# Patient Record
Sex: FEMALE | Race: WHITE | URBAN - NONMETROPOLITAN AREA
[De-identification: names, ages, dates, MRNs, and addresses within clinical notes are randomized per-mention and may not be internally consistent; named-entity substitution may affect disease eponyms.]

---

## 2021-10-14 ENCOUNTER — WEB ENCOUNTER (OUTPATIENT)
Dept: URBAN - NONMETROPOLITAN AREA CLINIC 13 | Facility: CLINIC | Age: 86
End: 2021-10-14

## 2021-10-14 ENCOUNTER — LAB OUTSIDE AN ENCOUNTER (OUTPATIENT)
Dept: URBAN - NONMETROPOLITAN AREA CLINIC 13 | Facility: CLINIC | Age: 86
End: 2021-10-14

## 2021-10-14 ENCOUNTER — OFFICE VISIT (OUTPATIENT)
Dept: URBAN - NONMETROPOLITAN AREA CLINIC 13 | Facility: CLINIC | Age: 86
End: 2021-10-14
Payer: MEDICARE

## 2021-10-14 VITALS
HEIGHT: 61 IN | DIASTOLIC BLOOD PRESSURE: 76 MMHG | WEIGHT: 110.4 LBS | BODY MASS INDEX: 20.84 KG/M2 | HEART RATE: 54 BPM | SYSTOLIC BLOOD PRESSURE: 210 MMHG

## 2021-10-14 DIAGNOSIS — R10.10 UPPER ABDOMINAL PAIN: ICD-10-CM

## 2021-10-14 DIAGNOSIS — K21.9 GERD WITHOUT ESOPHAGITIS: ICD-10-CM

## 2021-10-14 DIAGNOSIS — R68.81 EARLY SATIETY: ICD-10-CM

## 2021-10-14 PROCEDURE — 99214 OFFICE O/P EST MOD 30 MIN: CPT | Performed by: NURSE PRACTITIONER

## 2021-10-14 RX ORDER — GLUCOSAMINE/CHONDR SU A SOD 750-600 MG
1 CAPSULE TABLET ORAL ONCE A DAY
Status: ACTIVE | COMMUNITY

## 2021-10-14 RX ORDER — CARBOXYMETHYLCELLULOSE SODIUM 10 MG/ML
AS DIRECTED GEL OPHTHALMIC
Status: ACTIVE | COMMUNITY

## 2021-10-14 RX ORDER — FAMOTIDINE 40 MG/1
AS DIRECTED TABLET, FILM COATED ORAL ONCE A DAY
Status: ACTIVE | COMMUNITY

## 2021-10-14 RX ORDER — PANTOPRAZOLE SODIUM 40 MG/1
1 TABLET TABLET, DELAYED RELEASE ORAL BID
Qty: 180 TABLET | Refills: 0 | OUTPATIENT
Start: 2021-10-14

## 2021-10-14 RX ORDER — LOSARTAN POTASSIUM 50 MG/1
3 TABLETS TABLET ORAL ONCE A DAY
Status: ACTIVE | COMMUNITY

## 2021-10-14 RX ORDER — PANTOPRAZOLE SODIUM 40 MG/1
1 TABLET TABLET, DELAYED RELEASE ORAL ONCE A DAY
Status: ACTIVE | COMMUNITY

## 2021-10-14 RX ORDER — ATORVASTATIN CALCIUM 40 MG/1
1 TABLET TABLET, FILM COATED ORAL ONCE A DAY
Status: ACTIVE | COMMUNITY

## 2021-10-14 RX ORDER — FUROSEMIDE 20 MG/1
1 TABLET TABLET ORAL ONCE A DAY
Status: ACTIVE | COMMUNITY

## 2021-10-14 RX ORDER — ASPIRIN 81 MG/1
1 TABLET TABLET, COATED ORAL ONCE A DAY
Status: ACTIVE | COMMUNITY

## 2021-10-14 RX ORDER — HYDRALAZINE HYDROCHLORIDE 100 MG/1
1 TABLET TABLET, FILM COATED ORAL TWICE A DAY
Status: ACTIVE | COMMUNITY

## 2021-10-14 RX ORDER — AMLODIPINE BESYLATE 5 MG/1
1 TABLET TABLET ORAL TWICE A DAY
Status: ACTIVE | COMMUNITY

## 2021-10-14 RX ORDER — FENOFIBRATE 160 MG/1
1 TABLET TABLET ORAL ONCE A DAY
Status: ACTIVE | COMMUNITY

## 2021-10-14 RX ORDER — LEVOTHYROXINE SODIUM 0.05 MG/1
1 TABLET IN THE MORNING ON AN EMPTY STOMACH TABLET ORAL ONCE A DAY
Status: ACTIVE | COMMUNITY

## 2021-10-14 RX ORDER — METOPROLOL TARTRATE 25 MG/1
1/2 TABLET, FILM COATED ORAL ONCE A DAY
Status: ACTIVE | COMMUNITY

## 2021-10-14 RX ORDER — ISOSORBIDE MONONITRATE 60 MG/1
1 TABLET IN THE MORNING TABLET, EXTENDED RELEASE ORAL ONCE A DAY
Status: ACTIVE | COMMUNITY

## 2021-10-14 NOTE — HPI-TODAY'S VISIT:
10/14/21 Mrs Trinh Grady is a 89 YO F with hx of CKD stage III, CHF, HTN who is presents with complaints of burning abdominal pain, upper abdomen, early satiety, and weight loss. She was referred back by her nephrologist, Dr. Britton, for concerns of possible mesenteric ischemia. Last office visit was 2019 with Dr. Rush. Symptoms ongoing intermittently for 3-4 months but more persistent about 2 months ago. She down about 6#. She was eating tomatoes from her garden daily and noticed symptoms improved when she cut back on this. She takes PPI and Pepcid daily. Pepcid seems to help the burning. Maalox also helps. No change in her bowel habits. Denies rectal bleeding. She was told she had rectal prolapse in the past but does not feel rectum protruding and does not have to reduce prolapse. She last had EGD and colonoscopy several years ago elsewhere. TG

## 2021-11-18 ENCOUNTER — OFFICE VISIT (OUTPATIENT)
Dept: URBAN - NONMETROPOLITAN AREA CLINIC 13 | Facility: CLINIC | Age: 86
End: 2021-11-18
Payer: MEDICARE

## 2021-11-18 VITALS
HEIGHT: 61 IN | WEIGHT: 109 LBS | DIASTOLIC BLOOD PRESSURE: 65 MMHG | BODY MASS INDEX: 20.58 KG/M2 | HEART RATE: 60 BPM | SYSTOLIC BLOOD PRESSURE: 181 MMHG

## 2021-11-18 DIAGNOSIS — R68.81 EARLY SATIETY: ICD-10-CM

## 2021-11-18 DIAGNOSIS — R19.8 BORBORYGMI: ICD-10-CM

## 2021-11-18 DIAGNOSIS — R10.13 MIDEPIGASTRIC PAIN: ICD-10-CM

## 2021-11-18 PROBLEM — 266435005: Status: ACTIVE | Noted: 2021-10-14

## 2021-11-18 PROCEDURE — 99213 OFFICE O/P EST LOW 20 MIN: CPT | Performed by: NURSE PRACTITIONER

## 2021-11-18 RX ORDER — FAMOTIDINE 40 MG/1
AS DIRECTED TABLET, FILM COATED ORAL ONCE A DAY
Status: ACTIVE | COMMUNITY

## 2021-11-18 RX ORDER — FUROSEMIDE 20 MG/1
1 TABLET TABLET ORAL ONCE A DAY
Status: ACTIVE | COMMUNITY

## 2021-11-18 RX ORDER — PANTOPRAZOLE SODIUM 40 MG/1
1 TABLET TABLET, DELAYED RELEASE ORAL BID
Qty: 180 TABLET | Refills: 0 | Status: ACTIVE | COMMUNITY
Start: 2021-10-14

## 2021-11-18 RX ORDER — PANTOPRAZOLE SODIUM 40 MG/1
1 TABLET TABLET, DELAYED RELEASE ORAL ONCE A DAY
Status: ACTIVE | COMMUNITY

## 2021-11-18 RX ORDER — ASPIRIN 81 MG/1
1 TABLET TABLET, COATED ORAL ONCE A DAY
Status: ACTIVE | COMMUNITY

## 2021-11-18 RX ORDER — ISOSORBIDE MONONITRATE 60 MG/1
1 TABLET IN THE MORNING TABLET, EXTENDED RELEASE ORAL ONCE A DAY
Status: ACTIVE | COMMUNITY

## 2021-11-18 RX ORDER — GLUCOSAMINE/CHONDR SU A SOD 750-600 MG
1 CAPSULE TABLET ORAL ONCE A DAY
Status: ACTIVE | COMMUNITY

## 2021-11-18 RX ORDER — PANTOPRAZOLE SODIUM 40 MG/1
1 TABLET TABLET, DELAYED RELEASE ORAL BID
Qty: 180 | Refills: 1

## 2021-11-18 RX ORDER — ATORVASTATIN CALCIUM 40 MG/1
1 TABLET TABLET, FILM COATED ORAL ONCE A DAY
Status: ACTIVE | COMMUNITY

## 2021-11-18 RX ORDER — CARBOXYMETHYLCELLULOSE SODIUM 10 MG/ML
AS DIRECTED GEL OPHTHALMIC
Status: ACTIVE | COMMUNITY

## 2021-11-18 RX ORDER — LEVOTHYROXINE SODIUM 0.05 MG/1
1 TABLET IN THE MORNING ON AN EMPTY STOMACH TABLET ORAL ONCE A DAY
Status: ACTIVE | COMMUNITY

## 2021-11-18 RX ORDER — HYDRALAZINE HYDROCHLORIDE 100 MG/1
1 TABLET TABLET, FILM COATED ORAL TWICE A DAY
Status: ACTIVE | COMMUNITY

## 2021-11-18 RX ORDER — FENOFIBRATE 160 MG/1
1 TABLET TABLET ORAL ONCE A DAY
Status: ACTIVE | COMMUNITY

## 2021-11-18 RX ORDER — METOPROLOL TARTRATE 25 MG/1
1/2 TABLET, FILM COATED ORAL ONCE A DAY
Status: ACTIVE | COMMUNITY

## 2021-11-18 RX ORDER — METRONIDAZOLE 250 MG/1
1 TABLET TABLET ORAL THREE TIMES A DAY
Qty: 21 TABLET | Refills: 0 | OUTPATIENT
Start: 2021-11-18 | End: 2021-11-25

## 2021-11-18 RX ORDER — AMLODIPINE BESYLATE 5 MG/1
1 TABLET TABLET ORAL TWICE A DAY
Status: ACTIVE | COMMUNITY

## 2021-11-18 RX ORDER — LOSARTAN POTASSIUM 50 MG/1
3 TABLETS TABLET ORAL ONCE A DAY
Status: ACTIVE | COMMUNITY

## 2021-11-18 NOTE — HPI-TODAY'S VISIT:
10/14/21 Mrs Trinh Grady is a 89 YO F with hx of CKD stage III, CHF, HTN who is presents with complaints of burning abdominal pain, upper abdomen, early satiety, and weight loss. She was referred back by her nephrologist, Dr. Britton, for concerns of possible mesenteric ischemia. Last office visit was 2019 with Dr. Rush. Symptoms ongoing intermittently for 3-4 months but more persistent about 2 months ago. She down about 6#. She was eating tomatoes from her garden daily and noticed symptoms improved when she cut back on this. She takes PPI and Pepcid daily. Pepcid seems to help the burning. Maalox also helps. No change in her bowel habits. Denies rectal bleeding. She was told she had rectal prolapse in the past but does not feel rectum protruding and does not have to reduce prolapse. She last had EGD and colonoscopy several years ago elsewhere. TG  11/18/2021 Patient presents for follow up for epigastric abdominal burning, early satiety, and weight loss. She completed UGI  11/12/2021 that was unremarkable. She reports her pain is better since starting PPI once daily. However, today she reports lower abdominal burning that moves up to her upper abdomen. She reports normal stools most days but did have diarrhea on Saturday that has since resolved. No bleeding, mucous, fever with this. She continues to have poor appetite however her weight has stabilized. If she has an upset stomach, crackers seem to settle things down. Otherwise she reports borborgymi and gas.  She takes probiotic. TG

## 2022-01-19 ENCOUNTER — OFFICE VISIT (OUTPATIENT)
Dept: URBAN - NONMETROPOLITAN AREA CLINIC 13 | Facility: CLINIC | Age: 87
End: 2022-01-19
Payer: MEDICARE

## 2022-01-19 VITALS
HEIGHT: 61 IN | DIASTOLIC BLOOD PRESSURE: 53 MMHG | HEART RATE: 68 BPM | BODY MASS INDEX: 20.58 KG/M2 | SYSTOLIC BLOOD PRESSURE: 190 MMHG | WEIGHT: 109 LBS

## 2022-01-19 DIAGNOSIS — R10.10 UPPER ABDOMINAL PAIN: ICD-10-CM

## 2022-01-19 DIAGNOSIS — R19.8 BORBORYGMI: ICD-10-CM

## 2022-01-19 DIAGNOSIS — K58.2 MIXED IRRITABLE BOWEL SYNDROME: ICD-10-CM

## 2022-01-19 DIAGNOSIS — R68.81 EARLY SATIETY: ICD-10-CM

## 2022-01-19 PROCEDURE — 99214 OFFICE O/P EST MOD 30 MIN: CPT | Performed by: NURSE PRACTITIONER

## 2022-01-19 RX ORDER — HYDRALAZINE HYDROCHLORIDE 100 MG/1
1 TABLET TABLET, FILM COATED ORAL TWICE A DAY
Status: ACTIVE | COMMUNITY

## 2022-01-19 RX ORDER — LOSARTAN POTASSIUM 50 MG/1
3 TABLETS TABLET ORAL ONCE A DAY
Status: ACTIVE | COMMUNITY

## 2022-01-19 RX ORDER — DICYCLOMINE HYDROCHLORIDE 10 MG/1
1 CAPSULE BEFORE MEALS PRN ABDOMINAL PAIN CAPSULE ORAL THREE TIMES A DAY
Qty: 90 CAPSULE | Refills: 3 | OUTPATIENT
Start: 2022-01-19 | End: 2022-05-19

## 2022-01-19 RX ORDER — FAMOTIDINE 40 MG/1
AS DIRECTED TABLET, FILM COATED ORAL ONCE A DAY
Status: ACTIVE | COMMUNITY

## 2022-01-19 RX ORDER — FUROSEMIDE 20 MG/1
1 TABLET TABLET ORAL ONCE A DAY
Status: ACTIVE | COMMUNITY

## 2022-01-19 RX ORDER — METOPROLOL TARTRATE 25 MG/1
1/2 TABLET, FILM COATED ORAL ONCE A DAY
Status: ACTIVE | COMMUNITY

## 2022-01-19 RX ORDER — PANTOPRAZOLE SODIUM 40 MG/1
1 TABLET TABLET, DELAYED RELEASE ORAL ONCE A DAY
Qty: 90 TABLET | OUTPATIENT

## 2022-01-19 RX ORDER — AMLODIPINE BESYLATE 5 MG/1
1 TABLET TABLET ORAL TWICE A DAY
Status: ACTIVE | COMMUNITY

## 2022-01-19 RX ORDER — ATORVASTATIN CALCIUM 40 MG/1
1 TABLET TABLET, FILM COATED ORAL ONCE A DAY
Status: ACTIVE | COMMUNITY

## 2022-01-19 RX ORDER — LEVOTHYROXINE SODIUM 0.05 MG/1
1 TABLET IN THE MORNING ON AN EMPTY STOMACH TABLET ORAL ONCE A DAY
Status: ACTIVE | COMMUNITY

## 2022-01-19 RX ORDER — ASPIRIN 81 MG/1
1 TABLET TABLET, COATED ORAL ONCE A DAY
Status: ACTIVE | COMMUNITY

## 2022-01-19 RX ORDER — FENOFIBRATE 160 MG/1
1 TABLET TABLET ORAL ONCE A DAY
Status: ACTIVE | COMMUNITY

## 2022-01-19 RX ORDER — PANTOPRAZOLE SODIUM 40 MG/1
1 TABLET TABLET, DELAYED RELEASE ORAL BID
Qty: 180 | Refills: 1 | Status: ACTIVE | COMMUNITY

## 2022-01-19 RX ORDER — GLUCOSAMINE/CHONDR SU A SOD 750-600 MG
1 CAPSULE TABLET ORAL ONCE A DAY
Status: ACTIVE | COMMUNITY

## 2022-01-19 RX ORDER — CARBOXYMETHYLCELLULOSE SODIUM 10 MG/ML
AS DIRECTED GEL OPHTHALMIC
Status: ACTIVE | COMMUNITY

## 2022-01-19 RX ORDER — PANTOPRAZOLE SODIUM 40 MG/1
1 TABLET TABLET, DELAYED RELEASE ORAL ONCE A DAY
Status: ACTIVE | COMMUNITY

## 2022-01-19 RX ORDER — PSYLLIUM HUSK 0.4 G
2 CAPSULES WITH 8 OUNCES OF LIQUID CAPSULE ORAL DAILY
OUTPATIENT
Start: 2022-01-19 | End: 2022-02-18

## 2022-01-19 RX ORDER — METRONIDAZOLE 250 MG/1
1 TABLET TABLET ORAL THREE TIMES A DAY
Qty: 21 TABLET | Refills: 0 | OUTPATIENT
Start: 2022-01-19 | End: 2022-01-26

## 2022-01-19 RX ORDER — ISOSORBIDE MONONITRATE 60 MG/1
1 TABLET IN THE MORNING TABLET, EXTENDED RELEASE ORAL ONCE A DAY
Status: ACTIVE | COMMUNITY

## 2022-01-19 NOTE — HPI-TODAY'S VISIT:
10/14/21 Mrs Trinh Grady is a 89 YO F with hx of CKD stage III, CHF, HTN who is presents with complaints of burning abdominal pain, upper abdomen, early satiety, and weight loss. She was referred back by her nephrologist, Dr. Britton, for concerns of possible mesenteric ischemia. Last office visit was 2019 with Dr. Rush. Symptoms ongoing intermittently for 3-4 months but more persistent about 2 months ago. She down about 6#. She was eating tomatoes from her garden daily and noticed symptoms improved when she cut back on this. She takes PPI and Pepcid daily. Pepcid seems to help the burning. Maalox also helps. No change in her bowel habits. Denies rectal bleeding. She was told she had rectal prolapse in the past but does not feel rectum protruding and does not have to reduce prolapse. She last had EGD and colonoscopy several years ago elsewhere. TG  11/18/2021 Patient presents for follow up for epigastric abdominal burning, early satiety, and weight loss. She completed UGI  11/12/2021 that was unremarkable. She reports her pain is better since starting PPI once daily. However, today she reports lower abdominal burning that moves up to her upper abdomen. She reports normal stools most days but did have diarrhea on Saturday that has since resolved. No bleeding, mucous, fever with this. She continues to have poor appetite however her weight has stabilized. If she has an upset stomach, crackers seem to settle things down. Otherwise she reports borborgymi and gas.  She takes probiotic. TG  1/19/2021 Ms Grady presents for follow up for above symptoms. Her son, Jamin, is with her today. She reports epigastric pain has resolved with PPI BID. Her appetite is stable. No nausea/vomiting. Discussed attempting to wean back down to once daily and she agrees. Her main complaint today is loose stools and urgency for BM resulting in fecal incontinence. She cannot recall if her symptoms improved with flagyl after her last appointment but she reports she did do better with her symptoms until about 3 weeks ago. She had stopped the metamucil so she started this again and she has noticed improvement with more formed stools and less urgency. She does continue with several stools most mornings. She is taking the probiotic. TG

## 2022-04-21 ENCOUNTER — DASHBOARD ENCOUNTERS (OUTPATIENT)
Age: 87
End: 2022-04-21

## 2022-04-21 ENCOUNTER — CLAIMS CREATED FROM THE CLAIM WINDOW (OUTPATIENT)
Dept: URBAN - NONMETROPOLITAN AREA CLINIC 13 | Facility: CLINIC | Age: 87
End: 2022-04-21
Payer: MEDICARE

## 2022-04-21 VITALS
HEART RATE: 54 BPM | BODY MASS INDEX: 21.6 KG/M2 | HEIGHT: 61 IN | DIASTOLIC BLOOD PRESSURE: 64 MMHG | WEIGHT: 114.4 LBS | SYSTOLIC BLOOD PRESSURE: 206 MMHG

## 2022-04-21 DIAGNOSIS — K58.2 MIXED IRRITABLE BOWEL SYNDROME: ICD-10-CM

## 2022-04-21 DIAGNOSIS — R68.81 EARLY SATIETY: ICD-10-CM

## 2022-04-21 DIAGNOSIS — R10.10 UPPER ABDOMINAL PAIN: ICD-10-CM

## 2022-04-21 DIAGNOSIS — R19.8 BORBORYGMI: ICD-10-CM

## 2022-04-21 PROBLEM — 440544005: Status: ACTIVE | Noted: 2022-01-19

## 2022-04-21 PROCEDURE — 99213 OFFICE O/P EST LOW 20 MIN: CPT | Performed by: NURSE PRACTITIONER

## 2022-04-21 RX ORDER — PANTOPRAZOLE SODIUM 40 MG/1
1 TABLET TABLET, DELAYED RELEASE ORAL TWICE DAILY
OUTPATIENT

## 2022-04-21 RX ORDER — FAMOTIDINE 40 MG/1
AS DIRECTED TABLET, FILM COATED ORAL ONCE A DAY
Status: ACTIVE | COMMUNITY

## 2022-04-21 RX ORDER — AMLODIPINE BESYLATE 5 MG/1
1 TABLET TABLET ORAL TWICE A DAY
Status: ACTIVE | COMMUNITY

## 2022-04-21 RX ORDER — ASPIRIN 81 MG/1
1 TABLET TABLET, COATED ORAL ONCE A DAY
Status: ACTIVE | COMMUNITY

## 2022-04-21 RX ORDER — PANTOPRAZOLE SODIUM 40 MG/1
1 TABLET TABLET, DELAYED RELEASE ORAL TWICE DAILY
Status: ACTIVE | COMMUNITY

## 2022-04-21 RX ORDER — LEVOTHYROXINE SODIUM 0.05 MG/1
1 TABLET IN THE MORNING ON AN EMPTY STOMACH TABLET ORAL ONCE A DAY
Status: ACTIVE | COMMUNITY

## 2022-04-21 RX ORDER — ATORVASTATIN CALCIUM 40 MG/1
1 TABLET TABLET, FILM COATED ORAL ONCE A DAY
Status: ACTIVE | COMMUNITY

## 2022-04-21 RX ORDER — DICYCLOMINE HYDROCHLORIDE 10 MG/1
1 CAPSULE BEFORE MEALS PRN ABDOMINAL PAIN CAPSULE ORAL THREE TIMES A DAY
Qty: 90 CAPSULE | Refills: 3 | Status: ACTIVE | COMMUNITY
Start: 2022-01-19 | End: 2022-05-19

## 2022-04-21 RX ORDER — FUROSEMIDE 20 MG/1
1 TABLET TABLET ORAL ONCE A DAY
Status: ACTIVE | COMMUNITY

## 2022-04-21 RX ORDER — FAMOTIDINE 40 MG/1
1 TABLET AT BEDTIME TABLET, FILM COATED ORAL ONCE A DAY
Qty: 90 TABLET | Refills: 3

## 2022-04-21 RX ORDER — FENOFIBRATE 160 MG/1
1 TABLET TABLET ORAL ONCE A DAY
Status: ACTIVE | COMMUNITY

## 2022-04-21 RX ORDER — CARBOXYMETHYLCELLULOSE SODIUM 10 MG/ML
AS DIRECTED GEL OPHTHALMIC
Status: ACTIVE | COMMUNITY

## 2022-04-21 RX ORDER — METOPROLOL TARTRATE 25 MG/1
1/2 TABLET, FILM COATED ORAL ONCE A DAY
Status: ACTIVE | COMMUNITY

## 2022-04-21 RX ORDER — LOSARTAN POTASSIUM 50 MG/1
3 TABLETS TABLET ORAL ONCE A DAY
Status: ACTIVE | COMMUNITY

## 2022-04-21 RX ORDER — HYDRALAZINE HYDROCHLORIDE 100 MG/1
1 TABLET TABLET, FILM COATED ORAL TWICE A DAY
Status: ACTIVE | COMMUNITY

## 2022-04-21 RX ORDER — GLUCOSAMINE/CHONDR SU A SOD 750-600 MG
1 CAPSULE TABLET ORAL ONCE A DAY
Status: ACTIVE | COMMUNITY

## 2022-04-21 RX ORDER — ISOSORBIDE MONONITRATE 60 MG/1
1 TABLET IN THE MORNING TABLET, EXTENDED RELEASE ORAL ONCE A DAY
Status: ACTIVE | COMMUNITY

## 2022-04-21 RX ORDER — PANTOPRAZOLE SODIUM 40 MG/1
1 TABLET 30 MINUTES BEFORE A MEAL TABLET, DELAYED RELEASE ORAL ONCE A DAY
Qty: 90 TABLET | Refills: 3 | OUTPATIENT

## 2022-04-21 NOTE — HPI-TODAY'S VISIT:
10/14/21 Mrs Trinh Grady is a 89 YO F with hx of CKD stage III, CHF, HTN who is presents with complaints of burning abdominal pain, upper abdomen, early satiety, and weight loss. She was referred back by her nephrologist, Dr. Britton, for concerns of possible mesenteric ischemia. Last office visit was 2019 with Dr. Rush. Symptoms ongoing intermittently for 3-4 months but more persistent about 2 months ago. She down about 6#. She was eating tomatoes from her garden daily and noticed symptoms improved when she cut back on this. She takes PPI and Pepcid daily. Pepcid seems to help the burning. Maalox also helps. No change in her bowel habits. Denies rectal bleeding. She was told she had rectal prolapse in the past but does not feel rectum protruding and does not have to reduce prolapse. She last had EGD and colonoscopy several years ago elsewhere. TG  11/18/2021 Patient presents for follow up for epigastric abdominal burning, early satiety, and weight loss. She completed UGI  11/12/2021 that was unremarkable. She reports her pain is better since starting PPI once daily. However, today she reports lower abdominal burning that moves up to her upper abdomen. She reports normal stools most days but did have diarrhea on Saturday that has since resolved. No bleeding, mucous, fever with this. She continues to have poor appetite however her weight has stabilized. If she has an upset stomach, crackers seem to settle things down. Otherwise she reports borborgymi and gas.  She takes probiotic. TG  1/19/2021 Ms Grady presents for follow up for above symptoms. Her son, Jamin, is with her today. She reports epigastric pain has resolved with PPI BID. Her appetite is stable. No nausea/vomiting. Discussed attempting to wean back down to once daily and she agrees. Her main complaint today is loose stools and urgency for BM resulting in fecal incontinence. She cannot recall if her symptoms improved with flagyl after her last appointment but she reports she did do better with her symptoms until about 3 weeks ago. She had stopped the metamucil so she started this again and she has noticed improvement with more formed stools and less urgency. She does continue with several stools most mornings. She is taking the probiotic. TG  4/21/22 Patient presents for follow up accompanied by her son, Jamin. her abdominal pain, early satiety is better on pantoprazole and famotidine. She was to titrate down to pantoprazole once daily after her last followup but did not. She has gained 5# since her last appointment. She does need refills.  She did take the flagyl and continues on metamucil. She is not sure what helped but her stools are normal with only an occasional loose stool. She is not taking dicyclomine.  Overall, she is feeling well and has no acute complaints. TG